# Patient Record
Sex: MALE | Race: WHITE | NOT HISPANIC OR LATINO | Employment: UNEMPLOYED | URBAN - METROPOLITAN AREA
[De-identification: names, ages, dates, MRNs, and addresses within clinical notes are randomized per-mention and may not be internally consistent; named-entity substitution may affect disease eponyms.]

---

## 2018-01-11 NOTE — PROGRESS NOTES
Assessment    1  Adjustment disorder with anxious mood (309 24) (F43 22)   2  Anxiety (300 00) (F41 9)   3  Depression (311) (F32 9)    Plan  Adjustment disorder with anxious mood    · ALPRAZolam 0 5 MG Oral Tablet; take 1/2 - 1 tablet every 2 days as needed for  anxiety    Discussion/Summary    Ismael Swanson presents for followup of his anxiety, he is now following with a therapist that he really likes in [facility], he will be seeing a psychiatrist on Wednesday  I did give him 15 alprazolam, I advised him from this point forward the psychiatrist will need to provide him with all psychiatric medications including alprazolam  This hasn't concerned in case the psychiatrist doesn't want to prescribe him alprazolam  I did explain to him as I had in my lengthy discussion from his last visit that I find him very difficult to treat because he does not want to follow through on any of my recommendations and only wants to take alprazolam  I do not find that this is appropriate care and a psychiatrist is one who should be managing the severity of his anxiety because this goes beyond the scope of my expertise  He does reluctantly understand this,, prescribe alprazolam again, we will see him back after March 2 for a routine physical    Possible side effects of new medications were reviewed with the patient/guardian today  The treatment plan was reviewed with the patient/guardian  The patient/guardian understands and agrees with the treatment plan      Chief Complaint  Pt presents for a 3mo f/u and will like to discuss Alprazolam with refills today  Patient is here today for follow up of chronic conditions described in HPI  History of Present Illness  He is on his 3rd therapist, he is seeing John Collins in [facility] who he has seen 4 times, he is seeing her weekly  He is comfortable with her, she has referred him to Psychiatry because she feels he needs mood stabilizers   He is open to her suggestions and will see the psychiatrist  He feels he is doing better, he still feels it is anxiety provoking  He was taking the xanax daily at times and now will run out of the xanax before it is due to be refilled again  He is still apprehensive about taking any other medications, I asked him why he doesn't want to have to take any other medications for his anxiety but has no problem taking the xanax  He states he has anxiety about taking the anxiety medication  He will see the psych wednesday night, in 2 days  He states this psych who doesn't take his insurance who he feels he will see once and his therapist will network with  He is worried he only has 3 pills and what if the psych doesn't want to give him the xanax  I advise him this is what he should talk to the psychiatrist about  Review of Systems    Psychiatric: as noted in HPI  Active Problems    1  Adjustment disorder with anxious mood (309 24) (F43 22)   2  Anemia (285 9) (D64 9)   3  Anxiety (300 00) (F41 9)   4  Cellulitis (682 9) (L03 90)   5  Colorectal cancer (154 0) (C19)   6  Depression (311) (F32 9)   7  Esophagitis (530 10) (K20 9)   8  Gastritis (535 50) (K29 70)   9  Iron deficiency anemia (280 9) (D50 9)   10  Lyme disease (088 81) (A69 20)   11  Osteoarthritis of joint of toe of left foot (715 97) (M19 072)   12  Vitamin D deficiency (268 9) (E55 9)    Past Medical History    1  History of Fecal occult blood test positive (792 1) (R19 5)   2  History of Foot pain, unspecified laterality (729 5) (M79 673)   3  History of Joint pain, knee (719 46) (M25 569)   4  History of Laboratory examination ordered as part of a routine general medical   examination (V72 62) (Z00 00)   5  History of Need for influenza vaccination (V04 81) (Z23)   6  History of Preop examination (V72 84) (Z01 818)   7  History of Screening for diabetes mellitus (DM) (V77 1) (Z13 1)   8  History of Screening for lipoid disorders (V77 91) (Z13 220)   9   History of Screening for thyroid disorder (V77 0) (Z13 29)   10  History of Special screening examination for neoplasm of prostate (V76 44) (Z12 5)    Surgical History    1  History of Partial Colectomy   2  History of Sinus Surgery   3  History of Surgery Vas Deferens Vasectomy    Family History    1  Family history of Diabetes    Social History    · Never a smoker    Current Meds   1  ALPRAZolam 0 5 MG Oral Tablet; take 1/2 - 1 tablet every 2 days as needed for anxiety; Therapy: [id number] to (Evaluate:[id number]); Last Rx:[id number] Ordered    Allergies    1  Phenergan SOLN    Vitals  Vital Signs [Data Includes: Current Encounter]    Recorded: [id number] 01:08PM   Temperature 97 3 F, Tympanic   Heart Rate 57   Pulse Quality Norm   Respiration 16   Respiration Quality Norm   Systolic 248, LUE, Sitting   Diastolic 68, LUE, Sitting   Height 5 ft 10 12 in   Weight 175 lb 0 96 oz   BMI Calculated 25 03   BSA Calculated 1 98   O2 Saturation 98     Physical Exam    Constitutional   General appearance: No acute distress, well appearing and well nourished  Psychiatric   Orientation to person, place and time: Normal     Mood and affect: Normal   affect improved          Signatures   Electronically signed by : Janice Katz DO; Jan 18 2016  1:40PM EST                       (Author)

## 2023-05-25 ENCOUNTER — TELEPHONE (OUTPATIENT)
Dept: FAMILY MEDICINE CLINIC | Facility: CLINIC | Age: 58
End: 2023-05-25

## 2023-06-02 NOTE — TELEPHONE ENCOUNTER
06/02/23 12:18 PM        The office's request has been received, reviewed, and the patient chart updated  The PCP has successfully been removed with a patient attribution note  This message will now be completed          Thank you  Burnie Dubin